# Patient Record
Sex: FEMALE | URBAN - METROPOLITAN AREA
[De-identification: names, ages, dates, MRNs, and addresses within clinical notes are randomized per-mention and may not be internally consistent; named-entity substitution may affect disease eponyms.]

---

## 2022-03-08 ENCOUNTER — SEE NOTE (OUTPATIENT)
Dept: URBAN - METROPOLITAN AREA CLINIC 31 | Facility: CLINIC | Age: 22
Setting detail: DERMATOLOGY
End: 2022-03-08

## 2022-03-08 DIAGNOSIS — D22.39 MELANOCYTIC NEVI OF OTHER PARTS OF FACE: ICD-10-CM

## 2022-03-08 PROBLEM — L71.8 OTHER ROSACEA: Status: RESOLVED | Noted: 2022-03-08

## 2022-03-08 PROBLEM — L71.8 OTHER ROSACEA: Status: ACTIVE | Noted: 2022-03-08

## 2022-03-08 PROCEDURE — 99204 OFFICE O/P NEW MOD 45 MIN: CPT

## 2022-03-08 RX ORDER — TACROLIMUS 1 MG/G
1 AS DIRECTED OINTMENT TOPICAL EVERY MORNING
Qty: 30 | Refills: 1
Start: 2022-03-08

## 2022-03-08 RX ORDER — OXYMETAZOLINE HYDROCHLORIDE 1 G/100G
1 A SMALL AMOUNT CREAM TOPICAL AT BEDTIME
Qty: 30 | Refills: 2
Start: 2022-03-08

## 2022-03-09 ENCOUNTER — RX ONLY (RX ONLY)
Age: 22
End: 2022-03-09

## 2022-03-09 RX ORDER — TACROLIMUS 1 MG/G
1 AS DIRECTED OINTMENT TOPICAL AT BEDTIME
Qty: 60 | Refills: 5
Start: 2022-03-09

## 2022-03-09 RX ORDER — OXYMETAZOLINE HYDROCHLORIDE 1 G/100G
1 A SMALL AMOUNT CREAM TOPICAL EVERY MORNING
Qty: 30 | Refills: 5
Start: 2022-03-09

## 2023-10-10 ENCOUNTER — APPOINTMENT (OUTPATIENT)
Dept: URBAN - METROPOLITAN AREA CLINIC 206 | Age: 23
Setting detail: DERMATOLOGY
End: 2023-10-10

## 2023-10-10 DIAGNOSIS — H01.13 ECZEMATOUS DERMATITIS OF EYELID: ICD-10-CM

## 2023-10-10 PROBLEM — H01.131 ECZEMATOUS DERMATITIS OF RIGHT UPPER EYELID: Status: ACTIVE | Noted: 2023-10-10

## 2023-10-10 PROBLEM — H01.134 ECZEMATOUS DERMATITIS OF LEFT UPPER EYELID: Status: ACTIVE | Noted: 2023-10-10

## 2023-10-10 PROCEDURE — OTHER COUNSELING: OTHER

## 2023-10-10 PROCEDURE — OTHER PRESCRIPTION: OTHER

## 2023-10-10 PROCEDURE — OTHER PRESCRIPTION MEDICATION MANAGEMENT: OTHER

## 2023-10-10 PROCEDURE — 99213 OFFICE O/P EST LOW 20 MIN: CPT

## 2023-10-10 PROCEDURE — OTHER MIPS QUALITY: OTHER

## 2023-10-10 RX ORDER — DESONIDE 0.5 MG/G
CREAM TOPICAL
Qty: 60 | Refills: 2 | Status: ERX | COMMUNITY
Start: 2023-10-10

## 2023-10-10 ASSESSMENT — LOCATION SIMPLE DESCRIPTION DERM
LOCATION SIMPLE: LEFT SUPERIOR EYELID
LOCATION SIMPLE: RIGHT SUPERIOR EYELID

## 2023-10-10 ASSESSMENT — LOCATION DETAILED DESCRIPTION DERM
LOCATION DETAILED: RIGHT LATERAL SUPERIOR EYELID
LOCATION DETAILED: LEFT LATERAL SUPERIOR EYELID

## 2023-10-10 ASSESSMENT — LOCATION ZONE DERM: LOCATION ZONE: EYELID

## 2023-10-10 NOTE — HPI: RASH
What Type Of Note Output Would You Prefer (Optional)?: Bullet Format
Is The Patient Presenting As Previously Scheduled?: Yes
How Severe Is Your Rash?: moderate
Is This A New Presentation, Or A Follow-Up?: Rash
Additional History: Pt has tried aquaphor and cetaphil eye cream with no help. Pt has had similar problems a year ago, she saw eye doctor who gave her prescription to clear rash. Today is a better day.

## 2023-10-10 NOTE — PROCEDURE: PRESCRIPTION MEDICATION MANAGEMENT
Initiate Treatment: Desonide 0.05% cream: apply to affected area on eyelids twice daily x2 weeks, stop x2 weeks, repeat as needed. OK to apply aquaphor on top
Detail Level: Zone
Render In Strict Bullet Format?: No